# Patient Record
Sex: FEMALE | Race: WHITE | NOT HISPANIC OR LATINO | Employment: OTHER | URBAN - METROPOLITAN AREA
[De-identification: names, ages, dates, MRNs, and addresses within clinical notes are randomized per-mention and may not be internally consistent; named-entity substitution may affect disease eponyms.]

---

## 2022-04-26 ENCOUNTER — APPOINTMENT (OUTPATIENT)
Dept: RADIOLOGY | Facility: MEDICAL CENTER | Age: 67
End: 2022-04-26
Attending: EMERGENCY MEDICINE
Payer: MEDICARE

## 2022-04-26 ENCOUNTER — HOSPITAL ENCOUNTER (EMERGENCY)
Facility: MEDICAL CENTER | Age: 67
End: 2022-04-26
Attending: EMERGENCY MEDICINE
Payer: MEDICARE

## 2022-04-26 VITALS
WEIGHT: 170 LBS | HEART RATE: 87 BPM | TEMPERATURE: 99.1 F | SYSTOLIC BLOOD PRESSURE: 177 MMHG | OXYGEN SATURATION: 98 % | HEIGHT: 64 IN | RESPIRATION RATE: 18 BRPM | DIASTOLIC BLOOD PRESSURE: 73 MMHG | BODY MASS INDEX: 29.02 KG/M2

## 2022-04-26 DIAGNOSIS — R11.0 NAUSEA: ICD-10-CM

## 2022-04-26 DIAGNOSIS — R55 SYNCOPE, UNSPECIFIED SYNCOPE TYPE: ICD-10-CM

## 2022-04-26 LAB
ABO + RH BLD: NORMAL
ABO GROUP BLD: NORMAL
ALBUMIN SERPL BCP-MCNC: 4.9 G/DL (ref 3.2–4.9)
ALBUMIN/GLOB SERPL: 1.8 G/DL
ALP SERPL-CCNC: 64 U/L (ref 30–99)
ALT SERPL-CCNC: 18 U/L (ref 2–50)
ANION GAP SERPL CALC-SCNC: 16 MMOL/L (ref 7–16)
APTT PPP: 26.5 SEC (ref 24.7–36)
AST SERPL-CCNC: 21 U/L (ref 12–45)
BASOPHILS # BLD AUTO: 0.4 % (ref 0–1.8)
BASOPHILS # BLD: 0.03 K/UL (ref 0–0.12)
BILIRUB SERPL-MCNC: 0.4 MG/DL (ref 0.1–1.5)
BLD GP AB SCN SERPL QL: NORMAL
BUN SERPL-MCNC: 12 MG/DL (ref 8–22)
CALCIUM SERPL-MCNC: 10.2 MG/DL (ref 8.5–10.5)
CHLORIDE SERPL-SCNC: 98 MMOL/L (ref 96–112)
CO2 SERPL-SCNC: 20 MMOL/L (ref 20–33)
CREAT SERPL-MCNC: 0.94 MG/DL (ref 0.5–1.4)
EKG IMPRESSION: NORMAL
EOSINOPHIL # BLD AUTO: 0.08 K/UL (ref 0–0.51)
EOSINOPHIL NFR BLD: 1.2 % (ref 0–6.9)
ERYTHROCYTE [DISTWIDTH] IN BLOOD BY AUTOMATED COUNT: 44.9 FL (ref 35.9–50)
GFR SERPLBLD CREATININE-BSD FMLA CKD-EPI: 66 ML/MIN/1.73 M 2
GLOBULIN SER CALC-MCNC: 2.8 G/DL (ref 1.9–3.5)
GLUCOSE BLD STRIP.AUTO-MCNC: 104 MG/DL (ref 65–99)
GLUCOSE SERPL-MCNC: 116 MG/DL (ref 65–99)
HCT VFR BLD AUTO: 40.3 % (ref 37–47)
HGB BLD-MCNC: 13.9 G/DL (ref 12–16)
IMM GRANULOCYTES # BLD AUTO: 0.01 K/UL (ref 0–0.11)
IMM GRANULOCYTES NFR BLD AUTO: 0.1 % (ref 0–0.9)
INR PPP: 0.93 (ref 0.87–1.13)
LYMPHOCYTES # BLD AUTO: 2.56 K/UL (ref 1–4.8)
LYMPHOCYTES NFR BLD: 37.7 % (ref 22–41)
MCH RBC QN AUTO: 33.3 PG (ref 27–33)
MCHC RBC AUTO-ENTMCNC: 34.5 G/DL (ref 33.6–35)
MCV RBC AUTO: 96.4 FL (ref 81.4–97.8)
MONOCYTES # BLD AUTO: 0.45 K/UL (ref 0–0.85)
MONOCYTES NFR BLD AUTO: 6.6 % (ref 0–13.4)
NEUTROPHILS # BLD AUTO: 3.66 K/UL (ref 2–7.15)
NEUTROPHILS NFR BLD: 54 % (ref 44–72)
NRBC # BLD AUTO: 0 K/UL
NRBC BLD-RTO: 0 /100 WBC
PLATELET # BLD AUTO: 354 K/UL (ref 164–446)
PMV BLD AUTO: 9.1 FL (ref 9–12.9)
POTASSIUM SERPL-SCNC: 3.4 MMOL/L (ref 3.6–5.5)
PROT SERPL-MCNC: 7.7 G/DL (ref 6–8.2)
PROTHROMBIN TIME: 12.2 SEC (ref 12–14.6)
RBC # BLD AUTO: 4.18 M/UL (ref 4.2–5.4)
RH BLD: NORMAL
SODIUM SERPL-SCNC: 134 MMOL/L (ref 135–145)
TROPONIN T SERPL-MCNC: <6 NG/L (ref 6–19)
WBC # BLD AUTO: 6.8 K/UL (ref 4.8–10.8)

## 2022-04-26 PROCEDURE — 94760 N-INVAS EAR/PLS OXIMETRY 1: CPT

## 2022-04-26 PROCEDURE — 86900 BLOOD TYPING SEROLOGIC ABO: CPT

## 2022-04-26 PROCEDURE — 85610 PROTHROMBIN TIME: CPT

## 2022-04-26 PROCEDURE — 700111 HCHG RX REV CODE 636 W/ 250 OVERRIDE (IP): Performed by: EMERGENCY MEDICINE

## 2022-04-26 PROCEDURE — 84484 ASSAY OF TROPONIN QUANT: CPT

## 2022-04-26 PROCEDURE — 70450 CT HEAD/BRAIN W/O DYE: CPT

## 2022-04-26 PROCEDURE — 86850 RBC ANTIBODY SCREEN: CPT

## 2022-04-26 PROCEDURE — 82962 GLUCOSE BLOOD TEST: CPT

## 2022-04-26 PROCEDURE — 80053 COMPREHEN METABOLIC PANEL: CPT

## 2022-04-26 PROCEDURE — 85730 THROMBOPLASTIN TIME PARTIAL: CPT

## 2022-04-26 PROCEDURE — 93005 ELECTROCARDIOGRAM TRACING: CPT | Performed by: EMERGENCY MEDICINE

## 2022-04-26 PROCEDURE — 85025 COMPLETE CBC W/AUTO DIFF WBC: CPT

## 2022-04-26 PROCEDURE — 36415 COLL VENOUS BLD VENIPUNCTURE: CPT

## 2022-04-26 PROCEDURE — 99284 EMERGENCY DEPT VISIT MOD MDM: CPT

## 2022-04-26 PROCEDURE — 86901 BLOOD TYPING SEROLOGIC RH(D): CPT

## 2022-04-26 PROCEDURE — 71045 X-RAY EXAM CHEST 1 VIEW: CPT

## 2022-04-26 RX ORDER — ONDANSETRON 4 MG/1
4 TABLET, ORALLY DISINTEGRATING ORAL ONCE
Status: COMPLETED | OUTPATIENT
Start: 2022-04-26 | End: 2022-04-26

## 2022-04-26 RX ORDER — ONDANSETRON 4 MG/1
4 TABLET, ORALLY DISINTEGRATING ORAL EVERY 6 HOURS PRN
Qty: 10 TABLET | Refills: 0 | Status: SHIPPED | OUTPATIENT
Start: 2022-04-26

## 2022-04-26 RX ADMIN — ONDANSETRON 4 MG: 4 TABLET, ORALLY DISINTEGRATING ORAL at 16:47

## 2022-04-26 NOTE — ED TRIAGE NOTES
Georgiana Lang  67 y.o. female  Chief Complaint   Patient presents with   • Syncope     3 x in 2 weeks. Once 2 weeks ago, once Sunday. Seen in [place] ER each of 2 previous times. Concern for TIA. Wearing cardiac monitor currently x 1 week. Neurology appointment Friday. Vagal episode at 1330 today leading to 3rd syncopal episode. Slow speech following episode. R arm and leg decreased sensation.     Pt to triage with family for above complaint.     Pt is alert and oriented, speaking in full sentences with slow stuttered speech pattern that patient reports is not normal. Patient follows commands and responds appropriately to questions. Resp are even and unlabored.      Charge RN aware of patient. Stroke activated. Patient to charge desk, ERP with patient.     This RN masked and in appropriate PPE during encounter.     Vitals:    04/26/22 1445   BP: (!) 180/91   Pulse: 88   Resp: 14   SpO2: 100%

## 2022-04-26 NOTE — ED PROVIDER NOTES
"ED Provider  Scribed for Chuckie Duenas D.O. by Anaya Lewis. 4/26/2022  2:49 PM    Means of arrival:Walk-In  History obtained from: Nurse  History limited by: None    CHIEF COMPLAINT  Chief Complaint   Patient presents with    Syncope     3 x in 2 weeks. Once 2 weeks ago, once Sunday. Seen in [place] ER each of 2 previous times. Concern for TIA. Wearing cardiac monitor currently x 1 week. Neurology appointment Friday. Vagal episode at 1330 today leading to 3rd syncopal episode. Slow speech following episode. R arm and leg decreased sensation.       HPI  Georgiana Lang is a 67 y.o. female who presents as a stroke rule out following multiple vasovagal syncopal episodes within the last week. Her most recent syncopal episode was at 1330 today, and she is now experiencing symptoms of altered speech, and decreased sensation in the right arm and leg. She was seen two times in the past week at the [place] ER for the same symptoms. She is currently wearing a cardiac monitor, which has been in place for one week. Her next Neurology appointment is this upcoming Friday.     REVIEW OF SYSTEMS  See HPI for further details. All other systems are negative.     PAST MEDICAL HISTORY       SOCIAL HISTORY  Social History     Tobacco Use    Smoking status: None noted    Smokeless tobacco: None noted   Substance and Sexual Activity    Alcohol use: None noted    Drug use: None noted    Sexual activity: None noted       SURGICAL HISTORY  patient denies any surgical history    CURRENT MEDICATIONS  No current outpatient medications    ALLERGIES  No Known Allergies    PHYSICAL EXAM  VITAL SIGNS: BP (!) 180/91   Pulse 88   Resp 14   Ht 1.626 m (5' 4\")   Wt 77.1 kg (170 lb)   SpO2 100%   BMI 29.18 kg/m²    Constitutional: Alert in no apparent distress.  HENT: No signs of trauma, mucous membranes are moist  Eyes: Conjunctiva normal, Non-icteric.   Neck: Normal range of motion, No tenderness, Supple.  Lymphatic: No lymphadenopathy " noted.   Cardiovascular: Regular rate and rhythm, no murmurs.   Thorax & Lungs: Normal breath sounds, No respiratory distress, No wheezing, No chest tenderness.   Abdomen: Bowel sounds normal, Soft, No tenderness, No masses, No pulsatile masses. No peritoneal signs.  Skin: Warm, Dry, normal color.   Back: No bony tenderness, No CVA tenderness.   Extremities: No edema, No tenderness, No cyanosis  Musculoskeletal: Good range of motion in all major joints. No tenderness to palpation or major deformities noted.   Neurologic: NIH scale 0. Alert and oriented x4, Normal motor function, Normal sensory function, No focal deficits noted.   Psychiatric: Affect normal, Judgment normal, Mood normal.       DIAGNOSTIC STUDIES / PROCEDURES    EKG  12 Lead EKG interpreted by me shown below.      LABS  Results for orders placed or performed during the hospital encounter of 04/26/22   CBC WITH DIFFERENTIAL   Result Value Ref Range    WBC 6.8 4.8 - 10.8 K/uL    RBC 4.18 (L) 4.20 - 5.40 M/uL    Hemoglobin 13.9 12.0 - 16.0 g/dL    Hematocrit 40.3 37.0 - 47.0 %    MCV 96.4 81.4 - 97.8 fL    MCH 33.3 (H) 27.0 - 33.0 pg    MCHC 34.5 33.6 - 35.0 g/dL    RDW 44.9 35.9 - 50.0 fL    Platelet Count 354 164 - 446 K/uL    MPV 9.1 9.0 - 12.9 fL    Neutrophils-Polys 54.00 44.00 - 72.00 %    Lymphocytes 37.70 22.00 - 41.00 %    Monocytes 6.60 0.00 - 13.40 %    Eosinophils 1.20 0.00 - 6.90 %    Basophils 0.40 0.00 - 1.80 %    Immature Granulocytes 0.10 0.00 - 0.90 %    Nucleated RBC 0.00 /100 WBC    Neutrophils (Absolute) 3.66 2.00 - 7.15 K/uL    Lymphs (Absolute) 2.56 1.00 - 4.80 K/uL    Monos (Absolute) 0.45 0.00 - 0.85 K/uL    Eos (Absolute) 0.08 0.00 - 0.51 K/uL    Baso (Absolute) 0.03 0.00 - 0.12 K/uL    Immature Granulocytes (abs) 0.01 0.00 - 0.11 K/uL    NRBC (Absolute) 0.00 K/uL   COMP METABOLIC PANEL   Result Value Ref Range    Sodium 134 (L) 135 - 145 mmol/L    Potassium 3.4 (L) 3.6 - 5.5 mmol/L    Chloride 98 96 - 112 mmol/L    Co2 20 20 -  33 mmol/L    Anion Gap 16.0 7.0 - 16.0    Glucose 116 (H) 65 - 99 mg/dL    Bun 12 8 - 22 mg/dL    Creatinine 0.94 0.50 - 1.40 mg/dL    Calcium 10.2 8.5 - 10.5 mg/dL    AST(SGOT) 21 12 - 45 U/L    ALT(SGPT) 18 2 - 50 U/L    Alkaline Phosphatase 64 30 - 99 U/L    Total Bilirubin 0.4 0.1 - 1.5 mg/dL    Albumin 4.9 3.2 - 4.9 g/dL    Total Protein 7.7 6.0 - 8.2 g/dL    Globulin 2.8 1.9 - 3.5 g/dL    A-G Ratio 1.8 g/dL   PROTHROMBIN TIME   Result Value Ref Range    PT 12.2 12.0 - 14.6 sec    INR 0.93 0.87 - 1.13   APTT   Result Value Ref Range    APTT 26.5 24.7 - 36.0 sec   COD (ADULT)   Result Value Ref Range    ABO Grouping Only O     Rh Grouping Only POS     Antibody Screen-Cod NEG    TROPONIN   Result Value Ref Range    Troponin T <6 6 - 19 ng/L   ABO Rh Confirm   Result Value Ref Range    ABO Rh Confirm O POS    ESTIMATED GFR   Result Value Ref Range    GFR (CKD-EPI) 66 >60 mL/min/1.73 m 2   EKG (NOW)   Result Value Ref Range    Report       [facility] Emergency Dept.    Test Date:  2022  Pt Name:    YANNA FRANZ               Department: ER  MRN:        [id number]                      Room:        11  Gender:     Female                       Technician: [id number]  :        1955                   Requested By:ARTHUR PETERSEN  Order #:    [id number]                    Reading MD:    Measurements  Intervals                                Axis  Rate:       91                           P:          67  OH:         137                          QRS:        71  QRSD:       108                          T:          -16  QT:         371  QTc:        457    Interpretive Statements  Sinus rhythm  Nonspecific repol abnormality, diffuse leads  No previous ECG available for comparison     POCT glucose device results   Result Value Ref Range    POC Glucose, Blood 104 (H) 65 - 99 mg/dL     All labs reviewed by me.    RADIOLOGY  DX-CHEST-PORTABLE (1 VIEW)   Final Result      No acute  cardiopulmonary abnormality.      CT-HEAD W/O   Final Result      No acute intracranial abnormality.                    The radiologist's interpretations of all radiological studies have been reviewed by me.    Films have been independently by me      COURSE  Pertinent Labs & Imaging studies reviewed. (See chart for details)    2:49 PM - Patient seen and examined at nursing station for stroke protocol. Patient evaluated by myself and Dr. Gibson (Neuro hospitalist). He discussed plan of care with me and the patient. Patient did not have symptoms that would be considered for tPA. Dr. Gibson recommends non contrasted CT and metabolic work up. Ordered for DX-chest, CT head, CBC with differential, CMP, Prothrombin time, APTT, COD, Troponin and EKG to evaluate her symptoms.     4:22 PM- Reevaluated the patient on labs and imaging at this time. The patient is awake and alert. She is improved with no signs of a stroke. She complains of nausea and will be administered Zofran at this time.  Patient has a biotel patch on and I spoke with the company that provided this. They have no way to access the information for the event tonight, because the monitor must be sent back to the [employer]. Patient will be discharged home to follow up with her Cardiologist. Patient verbalizes understanding and agreement to this plan of care.     MEDICAL DECISION MAKING  This is a 67 y.o. female who presents as a code stroke from the [facility].  She had a syncopal episode and has complains of possible slurred speech and right-sided weakness.  On my evaluation the patient has no slurred speech, there is no lateralizing weakness.  She is awake and alert and oriented.  GCS of 15.    She is somnolent.  She was brought down for CT of the head without contrast as per the discussion with Dr. Avelar.    Patient has had 2 other episodes like this over the last week she has been seen by physicians at [facility], and as she has an appoint with neurology on Friday, and she  is currently wearing a Holter monitor.    Spoke with the company that produces this monitor.  The left upper information this monitor cannot be evaluated through telemetry.    She is awake alert oriented hemodynamically stable.  Electrolytes show no significant abnormalities there is no concerns for a cardiac injury.    She is to call her cardiologist to let them know that she had an event today while she had the monitor on so this can be evaluated more rapidly and appropriately.  She can follow-up with neurology on Friday as scheduled.      The patient will return for new or worsening symptoms and is stable at the time of discharge.    The patient is referred to a primary physician for blood pressure management, diabetic screening, and for all other preventative health concerns.    DISPOSITION:  Patient will be discharged home in stable condition.    FOLLOW UP:    Please call your cardiologist tomorrow.        OUTPATIENT MEDICATIONS:  Discharge Medication List as of 4/26/2022  4:52 PM        START taking these medications    Details   ondansetron (ZOFRAN ODT) 4 MG TABLET DISPERSIBLE Take 1 Tablet by mouth every 6 hours as needed for Nausea., Disp-10 Tablet, R-0, Print Rx Paper           FINAL IMPRESSION  1. Syncope, unspecified syncope type    2. Nausea         Anaya FISHMAN ([place]), am scribing for, and in the presence of, Chuckie Duenas D.O..    Electronically signed by: Anaya Lewis ([place]), 4/26/2022    Chuckie FISHMAN D.O. personally performed the services described in this documentation, as scribed by Anaya Lewis in my presence, and it is both accurate and complete.    The note accurately reflects work and decisions made by me.  Chuckie Duenas D.O.  4/26/2022  9:06 PM

## 2022-04-26 NOTE — ED NOTES
1500 code stroke called per charge RN. Pt to charge desk from triage.  1502 Dr. romano and Dr. Gibson at bedside.  1505 IV established and labs sent.  1507 Pt to CT.  1515 pt to R11 via [facility]. Pt placed on monitor.  1516 BG resulted 104.

## 2022-04-26 NOTE — ED NOTES
Assumed care of pt. At this time. She states she is feeling better. Speaking with daughter and sister at bedside. Able to lift right leg off of bed and hold for 10 seconds although L leg remains stronger. Now able to move BUE equally.

## 2022-04-26 NOTE — ED NOTES
"Pt. Able to get to BSC and back to bed. States \"I feel much better.\" Family remains at bedside and monitors remain in place.   "

## 2022-04-26 NOTE — ED NOTES
Pt. Ambulatory in hallways. Denies SOB, weakness or dizziness. Back to bed with monitors in place.

## 2022-04-26 NOTE — ED NOTES
Pt. Left ambulatory and in NAD. Pt. And family verbalized understanding of home monitoring, f/u and rx to . Pt. Denies further questions upon discharge.

## 2022-04-26 NOTE — DISCHARGE INSTRUCTIONS
Your evaluation here shows no cause for this event.  You do have a heart monitor but I cannot access the information from that here.    Please call your cardiologist tomorrow let them know you had an event that brought you into the emergency room so they can evaluate the monitor to determine if there was a heart rhythm problem causing this.  Otherwise continue follow-up with the neurologist on Friday as scheduled

## 2022-04-27 NOTE — DISCHARGE PLANNING
Call received from pt who would like her records sent to Dr Hartley's office so she can get an appt.    Call to office for fax number ([phone]) and records faxed with [employer].